# Patient Record
Sex: FEMALE
[De-identification: names, ages, dates, MRNs, and addresses within clinical notes are randomized per-mention and may not be internally consistent; named-entity substitution may affect disease eponyms.]

---

## 2019-02-10 ENCOUNTER — HOSPITAL ENCOUNTER (EMERGENCY)
Dept: HOSPITAL 14 - H.ER | Age: 49
Discharge: HOME | End: 2019-02-10
Payer: COMMERCIAL

## 2019-02-10 VITALS
SYSTOLIC BLOOD PRESSURE: 115 MMHG | RESPIRATION RATE: 20 BRPM | DIASTOLIC BLOOD PRESSURE: 70 MMHG | HEART RATE: 70 BPM | TEMPERATURE: 98 F

## 2019-02-10 VITALS — OXYGEN SATURATION: 100 %

## 2019-02-10 DIAGNOSIS — H57.89: Primary | ICD-10-CM

## 2019-02-10 DIAGNOSIS — T15.81XA: ICD-10-CM

## 2019-02-10 NOTE — ED PDOC
HPI: Eye Injury/Pain


Time Seen by Provider: 02/10/19 17:29


Chief Complaint (Nursing): Eye Problem


Chief Complaint (Provider): Eye Problem


History Per: Patient


History/Exam Limitations: no limitations


Onset/Duration Of Symptoms: Days (10-12)


Current Symptoms Are (Timing): Still Present


Quality: "Pain"


Associated Symptoms: Pain, Discharge From Eye (intermittent)


Additional Complaint(s): 


49 year old female with no significant medical history presents to the ED with 

right eye irritation associated with increased tearing, redness and photophobia 

for 10-12 days. She also reports intermittent mucus discharge. Patient wears 

glasses, no contacts. She was seen yesterday at [facility] and sent 

home with a prescription for Ofloxacin eye drops, which she has used twice so 

far. Patient offers no other medical complaints. Denies visual changes, fever, 

headache, dizziness. 





PMD: none provided 








Past Medical History


Reviewed: Historical Data, Nursing Documentation, Vital Signs


Vital Signs: 


                                Last Vital Signs











Temp  98.3 F   02/10/19 17:27


 


Pulse  77   02/10/19 17:27


 


Resp  18   02/10/19 17:27


 


BP  151/72 H  02/10/19 17:27


 


Pulse Ox  100   02/10/19 17:27














- Medical History


PMH: No Chronic Diseases





- Surgical History


Surgical History: 





- Family History


Family History: States: Unknown Family Hx





- Allergies


Allergies/Adverse Reactions: 


                                    Allergies











Allergy/AdvReac Type Severity Reaction Status Date / Time


 


No Known Allergies Allergy   Verified 02/10/19 17:29














Review of Systems


ROS Statement: Except As Marked, All Systems Reviewed And Found Negative


Eyes: Positive for: Pain, Redness (and increased tearing), Other (photophobia 

and intermittent mucus discharge)





Physical Exam





- Reviewed


Nursing Documentation Reviewed: Yes


Vital Signs Reviewed: Yes





- Physical Exam


Comments: 


GENERAL APPEARANCE: Patient is awake, alert, oriented x 3, in no acute distress.


HEENT:  (-) facial swelling and erythema, (-) facial blisters. (+)Increased 

tearing from right eye (-) periorbital edema, erythema, tenderness


VISUAL ACUITIES (with glasses):  Left eye: 20/ 25; Right eye: 20/25. Both: 20/20


LIDS & LASHES:  Normal. (-) crusting


PUPILS:  Pupils equal, round, and reactive.


EOM's:  Intact and painless.


LID EVERSION:  (-) foreign body.


CONJUNCTIVAE: faint conjunctival injection to right eye. (-) chemosis


ANTERIOR CHAMBER:  (-) hyphema.


FUNDUSCOPIC:  (+) 1mm x 1mm black foreign body overlying the iris at 4 o'clock 

of the right eye


FLUORESCEIN:  no uptake


RESPIRATORY: lungs clear to auscultation bilaterally, respirations even and 

nonlabored


CARDIAC: (-) irregularity





- ECG


O2 Sat by Pulse Oximetry: 100 (RA)


Pulse Ox Interpretation: Normal





Medical Decision Making


Medical Decision Makin:50 


Clinical Impression: eye irritation


--Fluorescein 1 mg OU 





18:20


Black foreign body overlying the iris at 4 o'clock position was removed using 

cotton tip applicator. 


Patient instructed to follow up with Dr. Gutierrez ([facility]) and continue with 

prescribed drops.  





Lab/Diagnostic results d/w the patient in great detail. Diagnosis of eye 

irritation, foreign body of eye d/w the patient. 


Based on history, exam and diagnostic results, plan will be for outpatient 

follow up with [facility]. 


Patient instructed to follow-up with pmd / referral provided / the clinic  in 1-

2 days without fail. Advised to take medication as prescribed from clinic. 

Return to the emergency room at any time for any new or worsening symptoms. 

Patient states she fully agrees with and understands discharge instructions. 

States that she agrees with the plan and disposition. Verbalized and repeated 

discharge instructions and plan. I have given the patient opportunity to ask any

additional questions.


---------------------------------------------------------------------------

----------------------


Scribe Attestation:


Documented by Renu Mancia, acting as a scribe for Rolanda Richter PA-C 





Provider Scribe Attestation:


All medical record entries made by the Scribe were at my direction and 

personally dictated by me. I have reviewed the chart and agree that the record 

accurately reflects my personal performance of the history, physical exam, 

medical decision making, and the department course for this patient. I have also

personally directed, reviewed, and agree with the discharge instructions and 

disposition.





Disposition





- Clinical Impression


Clinical Impression: 


 Irritation of right eye, Foreign body in eye








- Patient ED Disposition


Is Patient to be Admitted: No


Counseled Patient/Family Regarding: Studies Performed, Diagnosis, Need For 

Followup





- Disposition


Referrals: 


Zechariah Gutierrez MD [Staff Provider] - 


Disposition: Routine/Home


Disposition Time: 18:30


Condition: STABLE


Additional Instructions: 


FOLLOW UP WITH DR GUTIERREZ AS SOON AS POSSIBLE. CONTINUE ANTIBIOTIC EYE DROPS AS 

PREVIOUSLY PRESCRIBED. 





The emergency medical care you received today was directed at your acute 

symptoms. If you were prescribed any medication, please fill it and take as 

directed. It may take several days for your symptoms to resolve. Return to the 

Emergency Department if your symptoms worsen, do not improve, or if you have any

other problems.





Please contact your doctor in 2 days for re-evaluation and follow up / or call 

one of the physicians/clinics you have been referred to that are listed on the 

Patient Visit Information form that is included in your discharge packet. Bring 

any paperwork you were given at discharge with you along with any medications 

you are taking to your follow up visit. Our treatment cannot replace ongoing 

medical care by a primary care provider (PCP) outside of the emergency 

department.


Instructions:  How to Use Eye Drops, Foreign Body in Eye


Forms:  [employer] (English)


Print Language: ENGLISH





- POA


Present On Arrival: None